# Patient Record
Sex: MALE
[De-identification: names, ages, dates, MRNs, and addresses within clinical notes are randomized per-mention and may not be internally consistent; named-entity substitution may affect disease eponyms.]

---

## 2018-05-01 ENCOUNTER — HOSPITAL ENCOUNTER (EMERGENCY)
Dept: HOSPITAL 14 - H.ER | Age: 58
Discharge: HOME | End: 2018-05-01
Payer: COMMERCIAL

## 2018-05-01 VITALS — OXYGEN SATURATION: 100 % | TEMPERATURE: 97.7 F

## 2018-05-01 VITALS — RESPIRATION RATE: 18 BRPM | DIASTOLIC BLOOD PRESSURE: 71 MMHG | SYSTOLIC BLOOD PRESSURE: 123 MMHG | HEART RATE: 78 BPM

## 2018-05-01 DIAGNOSIS — E78.00: ICD-10-CM

## 2018-05-01 DIAGNOSIS — R10.13: Primary | ICD-10-CM

## 2018-05-01 LAB
ALBUMIN SERPL-MCNC: 4.3 G/DL (ref 3.5–5)
ALBUMIN/GLOB SERPL: 1.3 {RATIO} (ref 1–2.1)
ALT SERPL-CCNC: 38 U/L (ref 21–72)
AST SERPL-CCNC: 30 U/L (ref 17–59)
BASOPHILS # BLD AUTO: 0 K/UL (ref 0–0.2)
BASOPHILS NFR BLD: 0.4 % (ref 0–2)
BUN SERPL-MCNC: 29 MG/DL (ref 9–20)
CALCIUM SERPL-MCNC: 10 MG/DL (ref 8.4–10.2)
EOSINOPHIL # BLD AUTO: 0.1 K/UL (ref 0–0.7)
EOSINOPHIL NFR BLD: 1.5 % (ref 0–4)
ERYTHROCYTE [DISTWIDTH] IN BLOOD BY AUTOMATED COUNT: 14.2 % (ref 11.5–14.5)
GFR NON-AFRICAN AMERICAN: > 60
HGB BLD-MCNC: 15.9 G/DL (ref 12–18)
LIPASE SERPL-CCNC: 54 U/L (ref 23–300)
LYMPHOCYTES # BLD AUTO: 1.1 K/UL (ref 1–4.3)
LYMPHOCYTES NFR BLD AUTO: 10.8 % (ref 20–40)
MCH RBC QN AUTO: 29.8 PG (ref 27–31)
MCHC RBC AUTO-ENTMCNC: 34 G/DL (ref 33–37)
MCV RBC AUTO: 87.7 FL (ref 80–94)
MONOCYTES # BLD: 0.4 K/UL (ref 0–0.8)
MONOCYTES NFR BLD: 3.5 % (ref 0–10)
NEUTROPHILS # BLD: 8.5 K/UL (ref 1.8–7)
NEUTROPHILS NFR BLD AUTO: 83.8 % (ref 50–75)
NRBC BLD AUTO-RTO: 0.1 % (ref 0–0)
PLATELET # BLD: 228 K/UL (ref 130–400)
PMV BLD AUTO: 8.5 FL (ref 7.2–11.7)
RBC # BLD AUTO: 5.32 MIL/UL (ref 4.4–5.9)
WBC # BLD AUTO: 10.2 K/UL (ref 4.8–10.8)

## 2018-05-01 PROCEDURE — 96374 THER/PROPH/DIAG INJ IV PUSH: CPT

## 2018-05-01 PROCEDURE — 76705 ECHO EXAM OF ABDOMEN: CPT

## 2018-05-01 PROCEDURE — 85025 COMPLETE CBC W/AUTO DIFF WBC: CPT

## 2018-05-01 PROCEDURE — 99283 EMERGENCY DEPT VISIT LOW MDM: CPT

## 2018-05-01 PROCEDURE — 93005 ELECTROCARDIOGRAM TRACING: CPT

## 2018-05-01 PROCEDURE — 83690 ASSAY OF LIPASE: CPT

## 2018-05-01 PROCEDURE — 80053 COMPREHEN METABOLIC PANEL: CPT

## 2018-05-01 NOTE — CARD
--------------- APPROVED REPORT --------------





EKG Measurement

Heart [id number]

VT [id number]

[id number]

[id number]

[id number]



<Conclusion>

Sinus rhythm with 1st degree AV block

Otherwise normal ECG

## 2018-05-01 NOTE — ED PDOC
HPI: Abdomen


Time Seen by Provider: 05/01/18 05:00


Chief Complaint (Nursing): Abdominal Pain


Chief Complaint (Provider): abdominal pain


History Per: Patient


History/Exam Limitations: no limitations


Onset/Duration Of Symptoms: Hrs (4)


Current Symptoms Are (Timing): Still Present


Location Of Pain/Discomfort: Epigastric


Quality Of Discomfort: "Pain"


Associated Symptoms: Nausea, Vomiting


Additional Complaint(s): 





58 y/o male presents with epigastric abdominal pain x 4 hours.  Associated 

nausea, vomiting.  No relief with Tums.  Denies fever, chest pain, shortness of 

breath, palpitations, changes in bowel movements, urinary symptoms, recent 

travel, sick contacts.  





Past Medical History


Reviewed: Historical Data, Nursing Documentation, Vital Signs


Vital Signs: 


 Last Vital Signs











Temp  97.7 F   05/01/18 04:48


 


Pulse  81   05/01/18 04:48


 


Resp  16   05/01/18 04:48


 


BP  121/90   05/01/18 04:48


 


Pulse Ox  100   05/01/18 05:29














- Medical History


PMH: Hypercholesterolemia





- Surgical History


Surgical History: Hernia Repair





- Family History


Family History: States: No Known Family Hx





- Living Arrangements


Living Arrangements: With Family





- Home Medications


Home Medications: 


 Ambulatory Orders











 Medication  Instructions  Recorded


 


Omeprazole [Prilosec] 20 mg PO DAILY #14 ecc 09/29/14














- Allergies


Allergies/Adverse Reactions: 


 Allergies











Allergy/AdvReac Type Severity Reaction Status Date / Time


 


shrimp Allergy  ANAPHYLAXIS Verified 05/01/18 04:59














Review of Systems


ROS Statement: Except As Marked, All Systems Reviewed And Found Negative


Gastrointestinal: Positive for: Nausea, Vomiting, Abdominal Pain





Physical Exam





- Reviewed


Nursing Documentation Reviewed: Yes


Vital Signs Reviewed: Yes





- Physical Exam


Appears: Positive for: Well, Non-toxic, Uncomfortable


Head Exam: Positive for: ATRAUMATIC, NORMAL INSPECTION, NORMOCEPHALIC


Skin: Positive for: Normal Color


Eye Exam: Positive for: Normal appearance


ENT: Positive for: Normal ENT Inspection


Cardiovascular/Chest: Positive for: Regular Rate, Rhythm


Respiratory: Positive for: Normal Breath Sounds


Gastrointestinal/Abdominal: Positive for: Bowel Sounds, Soft, Tenderness (

epigastric)


Back: Positive for: Normal Inspection


Extremity: Positive for: Normal ROM


Neurologic/Psych: Positive for: Alert, Oriented





- Laboratory Results


Result Diagrams: 


 05/01/18 05:30





- ECG


ECG: Positive for: Viewed By Me (reviewed by ED attending)


ECG Rhythm: Positive for: Sinus Rhythm


O2 Sat by Pulse Oximetry: 100





- Progress


ED Course And Treament: 





labs, ekg, IV fluids, IV pepcid, IV reglan, ODT zofran, abdomen u/s





Disposition





- Clinical Impression


Clinical Impression: 


 Abdominal pain








- Disposition


Disposition: Transfer of Care


Disposition Time: 06:00


Condition: STABLE


Forms:  [facility] (English)


Patient Signed Over To: Kareem Vang


Handoff Comments: pending labs, u/s, re-eval

## 2018-05-01 NOTE — ED PDOC
- Laboratory Results


Result Diagrams: 


 05/01/18 05:30





 05/01/18 05:30





- ECG


O2 Sat by Pulse Oximetry: 100





Medical Decision Making


Medical Decision Making: 





Accession No. : [id number]


Patient Name / ID : EDWARD CAMPBELL  / [id number]


Exam Date : 05/01/2018 09:32:37 ( Approved )


Study Comment : 


Sex / Age : M  / 57Y





Creator : Kenneth Barahona MD


Dictator : Kenneth Barahona MD


 : 


Approver : Kenneth Barahona MD


Approver2 : 





Report Date : 05/01/2018 09:28:40


My Comment : 


********************************************************************************

***





HISTORY:


upper abd pain, vomiting





COMPARISON:


Abdominal ultrasound dated 09/29/2014.





TECHNIQUE:


Sonographic evaluation of the right upper quadrant of the abdomen.





FINDINGS:





LIVER:


Measures 15.6 cm in length. Normal echogenicity of the liver parenchyma.  Right 

hepatic lobe simple cyst redemonstrated measuring 2.1 x 2.2 x 1.9 cm. No 

intrahepatic bile duct dilatation. 





GALLBLADDER:


Unremarkable. No gallstones. 





COMMON BILE DUCT:


Measures 6 mm.  No stones. No dilatation.





PANCREAS:


Unremarkable as visualized. No mass. No ductal dilatation.





RIGHT KIDNEY:


Measures 10.7 x 5.7 x 6.2 cm in length. Normal echogenicity. No calculus, mass, 

or hydronephrosis.





AORTA:


No aneurysmal dilatation.





IVC:


Unremarkable.





OTHER FINDINGS:


None .





IMPRESSION:


Stable right hepatic lobe simple cyst.  Otherwise, unremarkable right upper 

quadrant abdominal ultrasound








---------------


no further vomiting in ED


Recd IVF and improved clinically


Abd nontender on re-eval at 10am





DC w zofran ODT and referral to GI








Disposition


Counseled Patient/Family Regarding: Studies Performed, Diagnosis, Need For 

Followup, Rx Given





- Clinical Impression


Clinical Impression: 


 Abdominal pain








- POA


Present On Arrival: None





- Disposition


Referrals: 


Manohar Paris MD [Primary Care Provider] - 


Sabino Gray MD [Staff Provider] - 


Disposition: Routine/Home


Disposition Time: 10:07


Condition: STABLE


Prescriptions: 


Ondansetron ODT [Zofran ODT] 4 mg PO Q6 PRN #10 odt


 PRN Reason: Nausea/Vomiting


Ranitidine HCl [Zantac] 150 mg PO BID #20 tablet


Instructions:  Acute Abdomen (Belly Pain), Adult (DC), Nausea and Vomiting, 

Adult


Forms:  [facility] (English)


Print Language: Khmer

## 2018-05-01 NOTE — US
HISTORY:

upper abd pain, vomiting



COMPARISON:

Abdominal ultrasound dated 09/29/2014.



TECHNIQUE:

Sonographic evaluation of the right upper quadrant of the abdomen.



FINDINGS:



LIVER:

Measures 15.6 cm in length. Normal echogenicity of the liver 

parenchyma.  Right hepatic lobe simple cyst redemonstrated measuring 

2.1 x 2.2 x 1.9 cm. No intrahepatic bile duct dilatation. 



GALLBLADDER:

Unremarkable. No gallstones. 



COMMON BILE DUCT:

Measures 6 mm.  No stones. No dilatation.



PANCREAS:

Unremarkable as visualized. No mass. No ductal dilatation.



RIGHT KIDNEY:

Measures 10.7 x 5.7 x 6.2 cm in length. Normal echogenicity. No 

calculus, mass, or hydronephrosis.



AORTA:

No aneurysmal dilatation.



IVC:

Unremarkable.



OTHER FINDINGS:

None .



IMPRESSION:

Stable right hepatic lobe simple cyst.  Otherwise, unremarkable right 

upper quadrant abdominal ultrasound